# Patient Record
(demographics unavailable — no encounter records)

---

## 2025-03-12 NOTE — HISTORY OF PRESENT ILLNESS
[de-identified] : 03/12/2025 GISELA tyson 50 year  is here today for evaluation of left knee. Patient denies JAX. Patient reports pain since a few months ago. Patient notes pain is localized. Patient denies N/T. Patient states pain is worse when sitting or standing. Patient has not tried anything for pain relief.

## 2025-03-12 NOTE — DISCUSSION/SUMMARY
[de-identified] : L knee CSI PT script Offered mobic, will use OTC's NSAIDs for pain F/u PRN if pain does not improve All questions answered, agreeable with plan

## 2025-03-12 NOTE — IMAGING
[de-identified] : Gait: Non-antalgic Alignment: Neutral Scars: None   L Knee: Tenderness: medial joint line ROM: 0-120 degrees Crepitus: None Effusion: minimal Warmth: None   Meniscal Signs: Pain on terminal extension: pos Pain on terminal flexion: pos McMurrays: pos - neg Thessaly's: Neg   Ligament Exam: Lachman: neg Post Drawer: neg Valgus stress: neg at 0 and 30 degrees Varus stress: neg at 0 and 30 degrees Pivot shift: neg Dial test: neg at 30 degrees, neg at 90 degrees   Strength: Quads: 5/5 Hamstrin/5 DF/PF/EHL 5/5   Sensation grossly intact in all dermatomes, DP/PT 2+, brisk capillary refill distally [Left] : left knee [All Views] : anteroposterior, lateral, skyline, and anteroposterior standing [Mild tricompartmental OA lateral narrowing] : Mild tricompartmental OA lateral narrowing